# Patient Record
(demographics unavailable — no encounter records)

---

## 2018-01-13 NOTE — EDM.PDOC
ED HPI GENERAL MEDICAL PROBLEM





- General


Chief Complaint: Abdominal Pain


Stated Complaint: ABDOMINAL PAIN RUQ


Time Seen by Provider: 01/13/18 12:57


Source of Information: Reports: Patient


History Limitations: Reports: No Limitations





- History of Present Illness


INITIAL COMMENTS - FREE TEXT/NARRATIVE: 





This patient is a 63 year old female that presents to the ER. The patient 

reports that she has had RUQ abdominal pain for 3 days. She reports that she 

has had this abdominal pain for about 3 years that comes and goes. She reports 

she has had workups and has not been given a specific diagnosis for this. 

Patient reports that she just wanted to have it checked out today to make sure 

its not emergent because she flies tomorrow. The patient reports she has been 

eating peanuts lately. Patient denies ha, dizziness, cp, soa, urinary/bowel 

changes, neck pain, back pain. Stable. 


Onset Date: 01/10/18


Duration: Day(s): (3)


Location: Reports: Abdomen


Quality: Reports: Other (cramping)


Severity: Mild


Improves with: Reports: None


Worsens with: Reports: None


Associated Symptoms: Denies: Confusion, Chest Pain, Cough, cough w sputum, 

Diaphoresis, Fever/Chills, Headaches, Loss of Appetite, Malaise, Nausea/Vomiting

, Rash, Seizure, Shortness of Breath, Syncope, Weakness


  ** Right Upper Abdomen


Pain Score (Numeric/FACES): 5





- Related Data


 Allergies











Allergy/AdvReac Type Severity Reaction Status Date / Time


 


No Known Allergies Allergy   Verified 01/13/18 12:13











Home Meds: 


 Home Meds





Levothyroxine [Synthroid] 88 mcg PO DAILY 01/13/18 [History]


atorvaSTATin [Lipitor] 20 mg PO DAILY 01/13/18 [History]











Past Medical History


Cardiovascular History: Reports: Other (See Below)


Gastrointestinal History: Reports: Chronic Constipation


OB/GYN History: Reports: Other (See Below)


Other OB/BYN History: HYSTERECTOMY


Musculoskeletal History: Reports: Arthritis


Endocrine/Metabolic History: Reports: Hypothyroidism, Other (See Below)


Other Endocrine/Metabolic History: THRYOIDECTOMY DUE TO TUMORS





- Past Surgical History


Cardiovascular Surgical History: Reports: Carotid Stents





Social & Family History





- Family History


Family Medical History: Noncontributory





- Tobacco Use


Smoking Status *Q: Former Smoker


Years of Tobacco use: 15


Used Tobacco, but Quit: Yes


Month Tobacco Last Used: 1994





- Alcohol Use


Days Per Week of Alcohol Use: 1


Number of Drinks Per Day: 2


Total Drinks Per Week: 2





- Recreational Drug Use


Recreational Drug Use: No





ED ROS GENERAL





- Review of Systems


Review Of Systems: See Below


Constitutional: Reports: No Symptoms


HEENT: Reports: No Symptoms


Respiratory: Reports: No Symptoms


Cardiovascular: Reports: No Symptoms


Endocrine: Reports: No Symptoms


GI/Abdominal: Reports: Abdominal Pain (comes and goes, cramping. Good right now 

per patient. ).  Denies: Nausea, Vomiting


: Reports: No Symptoms


Musculoskeletal: Reports: No Symptoms


Skin: Reports: No Symptoms


Neurological: Reports: No Symptoms


Psychiatric: Reports: No Symptoms


Hematologic/Lymphatic: Reports: No Symptoms


Immunologic: Reports: No Symptoms





ED EXAM, GI/ABD





- Physical Exam


Exam: See Below


Exam Limited By: No Limitations


General Appearance: Alert, WD/WN, No Apparent Distress


Eyes: Bilateral: Normal Appearance


Ears: Normal External Exam, Normal Canal, Hearing Grossly Normal, Normal TMs


Nose: Normal Inspection, Normal Mucosa, No Blood


Throat/Mouth: Normal Inspection, Normal Lips, Normal Teeth, Normal Gums, Normal 

Oropharynx, Normal Voice, No Airway Compromise


Head: Atraumatic, Normocephalic


Neck: Normal Inspection, Supple, Non-Tender, Full Range of Motion


Respiratory/Chest: No Respiratory Distress, Lungs Clear, Normal Breath Sounds, 

No Accessory Muscle Use, Chest Non-Tender


Cardiovascular: Normal Peripheral Pulses, Regular Rate, Rhythm, No Edema, No 

Gallop, No JVD, No Murmur, No Rub


GI/Abdominal Exam: Normal Bowel Sounds, Soft, No Organomegaly, No Distention, 

No Abnormal Bruit, No Mass, Pelvis Stable, Tender (mild RUQ. ).  No: Distended, 

Guarding, Rigid, Rebound, Abnormal Bowel Sounds, Mass, Hepatomegaly, 

Splenomegaly


 (Female) Exam: Deferred


Rectal (Female) Exam: Deferred


Back Exam: Normal Inspection, Full Range of Motion.  No: CVA Tenderness (L), 

CVA Tenderness (R)


Extremities: Normal Inspection, Normal Range of Motion, Non-Tender, No Pedal 

Edema, Normal Capillary Refill


Neurological: Alert, Oriented, Normal Cognition, Normal Gait, No Motor/Sensory 

Deficits


Psychiatric: Normal Affect, Normal Mood


Skin Exam: Warm, Dry, Intact, Normal Color, No Rash


Lymphatic: No Adenopathy





Course





- Vital Signs


Last Recorded V/S: 


 Last Vital Signs











Temp  97.8 F   01/13/18 12:07


 


Pulse  90   01/13/18 12:07


 


Resp  18   01/13/18 12:07


 


BP  152/96 H  01/13/18 12:07


 


Pulse Ox  99   01/13/18 12:07














- Orders/Labs/Meds


Labs: 


 Laboratory Tests











  01/13/18 01/13/18 01/13/18 Range/Units





  12:20 12:20 12:21 


 


WBC  5.1    (5.0-10.0)  10^3/uL


 


RBC  4.14    (4.00-5.50)  10^6/uL


 


Hgb  12.9    (12.0-16.0)  g/dL


 


Hct  37.5    (37.0-47.0)  %


 


MCV  90.6    (82.0-94.0)  fL


 


MCH  31.2    (27.0-32.0)  pg


 


MCHC  34.4    (33.0-38.0)  g/dL


 


RDW Coeff of Magda  12.3    (11.0-15.0)  %


 


Plt Count  231    (150-400)  10^3/uL


 


Neut % (Auto)  56.3    (35-85)  %


 


Lymph % (Auto)  33.0    (10-55)  %


 


Mono % (Auto)  8.5    (0-16)  %


 


Eos % (Auto)  1.8    (0-5)  %


 


Baso % (Auto)  0.4    (0-3)  %


 


Neut # (Auto)  2.85    (1.80-7.00)  10^3/uL


 


Lymph # (Auto)  1.67    (1.00-4.80)  10^3/uL


 


Mono # (Auto)  0.43    (0.00-0.80)  10^3/uL


 


Eos # (Auto)  0.09    (0.00-0.45)  10^3/uL


 


Baso # (Auto)  0.02    10^3/uL


 


Sodium   134 L   (136-145)  mEq/L


 


Potassium   4.0   (3.5-5.0)  mEq/L


 


Chloride   100   ()  mEq/L


 


Carbon Dioxide   27   (21-32)  mmol/L


 


BUN   9   (7-18)  mg/dL


 


Creatinine   1.0   (0.6-1.0)  mg/dL


 


Est Cr Clr Drug Dosing   43.45   mL/min


 


Estimated GFR (MDRD)   56 L   (>=60)  mL/min


 


Glucose   156 H   (75-99)  mg/dL


 


Calcium   8.6   (8.4-10.1)  mg/dL


 


Total Bilirubin   0.7   (0.0-1.0)  mg/dL


 


AST   23   (15-37)  U/L


 


ALT   26   (12-78)  U/L


 


Alkaline Phosphatase   130 H   ()  U/L


 


Troponin I   < 0.017   (0.00-0.06)  ng/mL


 


Total Protein   7.7   (6.4-8.2)  g/dL


 


Albumin   4.1   (3.4-5.0)  g/dL


 


Amylase   50   ()  U/L


 


Urine Color    Yellow  (YELLOW)  


 


Urine Appearance    Clear  (CLEAR)  


 


Urine pH    6.0  (4.5-8.0)  


 


Ur Specific Gravity    1.015  (1.003-1.020)  


 


Urine Protein    Negative  (NEGATIVE)  mg/dL


 


Urine Glucose (UA)    Negative  (NEGATIVE)  mg/dL


 


Urine Ketones    Trace H  (NEGATIVE)  mg/dL


 


Urine Occult Blood    Negative  (NEGATIVE)  


 


Urine Nitrite    Negative  (NEGATIVE)  


 


Urine Bilirubin    Negative  (NEGATIVE)  


 


Urine Urobilinogen    0.2  (0.2-1.0)  EU/dL


 


Ur Leukocyte Esterase    Negative  (NEGATIVE)  


 


Urine RBC    Not seen  (0-5)  /HPF


 


Urine WBC    Not seen  (0-5)  /HPF


 


Ur Squamous Epith Cells    Occasional H  (NOT SEEN)  /HPF


 


Urine Bacteria    Occasional H  (NOT SEEN)  /HPF














Departure





- Departure


Time of Disposition: 13:13


Disposition: Home, Self-Care 01


Condition: Good


Clinical Impression: 


Abdominal pain


Qualifiers:


 Abdominal location: right upper quadrant Qualified Code(s): R10.11 - Right 

upper quadrant pain








- Discharge Information


Instructions:  Abdominal Pain, Adult, Easy-to-Read


Referrals: 


PCP,None [Primary Care Provider] - 


Forms:  ED Department Discharge


Additional Instructions: 


Followup with your primary care provider


Return to the ER for worsening of condition or any emergent concerns such as 

increase in pain, fever, vomiting. 


Increase fluids


Avoid Peanuts and Nuts





- Assessment/Plan


Plan: 





PLEASE SEE RN NOTE FOR PFSH.

## 2019-09-12 NOTE — EDM.PDOC
ED HPI GENERAL MEDICAL PROBLEM





- General


Chief Complaint: Cardiovascular Problem


Stated Complaint: high blood pressure


Time Seen by Provider: 09/12/19 22:50


Source of Information: Reports: Patient


History Limitations: Reports: No Limitations





- History of Present Illness


INITIAL COMMENTS - FREE TEXT/NARRATIVE: 





States that she didn't feel well at home and took her BP and it was up.  She 

had taken Mucinex DM about 1-2 hours prior due to cold symptoms that she has 

been having.  She has history of elevated BP when in the clinic and when back 

at home it will go back to normal.  Had some right shoulder pain with it and 

when BP was up she didn't feel as well.  Has some URI symptoms currently with 

dry cough.  No fever.  Did feel anxious at home as she repeated her BP.


Onset: Gradual


Location: Reports: Upper Extremity, Right (right shoulder pain.)


Associated Symptoms: Reports: Cough


Treatments PTA: Reports: Aspirin





- Related Data


 Allergies











Allergy/AdvReac Type Severity Reaction Status Date / Time


 


acetaminophen [From Lortab] Allergy  Swollen Verified 09/12/19 22:29





   Tongue  


 


adhesive tape Allergy  Rash Verified 09/12/19 22:29


 


hydrocodone [From Lortab] Allergy  Swollen Verified 09/12/19 22:29





   Tongue  











Home Meds: 


 Home Meds





Levothyroxine [Synthroid] 88 mcg PO DAILY 01/13/18 [History]


atorvaSTATin [Lipitor] 20 mg PO DAILY 01/13/18 [History]


Aspirin [Adult Low Dose Aspirin EC] 81 mg PO DAILY 09/12/19 [History]











Past Medical History


Cardiovascular History: Reports: High Cholesterol


Gastrointestinal History: Reports: Chronic Constipation


OB/GYN History: Reports: Pregnancy


Other OB/GYN History: HYSTERECTOMY


Musculoskeletal History: Reports: Arthritis


Endocrine/Metabolic History: Reports: Hypothyroidism


Other Endocrine/Metabolic History: THRYOIDECTOMY DUE TO TUMORS





- Past Surgical History


Cardiovascular Surgical History: Reports: Carotid Stents


Female  Surgical History: Reports: Hysterectomy, Other (See Below)


Other Female  Surgeries/Procedures: tumor removal from R) breast; non 

cancerous


Endocrine Surgical History: Reports: Thyroidectomy


Musculoskeletal Surgical History: Reports: Other (See Below)


Other Musculoskeletal Surgeries/Procedures:: rods to back





Social & Family History





- Family History


Family Medical History: Noncontributory





- Tobacco Use


Smoking Status *Q: Former Smoker


Used Tobacco, but Quit: Yes


Month/Year Tobacco Last Used: 1994





- Caffeine Use


Caffeine Use: Reports: Coffee





ED ROS GENERAL





- Review of Systems


Review Of Systems: See Below


Constitutional: Denies: Fever, Chills


HEENT: Reports: No Symptoms


Respiratory: Reports: Cough.  Denies: Shortness of Breath, Sputum


Cardiovascular: Denies: Chest Pain, Edema


GI/Abdominal: Reports: No Symptoms


Musculoskeletal: Reports: No Symptoms


Skin: Reports: No Symptoms


Neurological: Reports: No Symptoms


Psychiatric: Reports: Anxiety





ED EXAM, GENERAL





- Physical Exam


Exam: See Below


Exam Limited By: No Limitations


General Appearance: Alert, WD/WN, No Apparent Distress


Ears: Normal External Exam, Normal Canal, Normal TMs


Nose: Normal Inspection


Throat/Mouth: Normal Inspection, Normal Oropharynx


Head: Atraumatic, Normocephalic


Neck: Normal Inspection, Supple, Non-Tender


Respiratory/Chest: No Respiratory Distress, Lungs Clear, Normal Breath Sounds


Cardiovascular: Normal Peripheral Pulses, Regular Rate, Rhythm, No Edema, No 

Murmur


GI/Abdominal: Normal Bowel Sounds, Soft, Non-Tender, No Organomegaly


Back Exam: Normal Inspection


Extremities: Normal Inspection, No Pedal Edema, Normal Capillary Refill


Neurological: Alert, Oriented


Psychiatric: Normal Affect, Anxious


Skin Exam: Warm, Dry, Intact





Course





- Vital Signs


Last Recorded V/S: 


 Last Vital Signs











Temp  97.2 F   09/12/19 22:20


 


Pulse  70   09/12/19 23:25


 


Resp  18   09/12/19 22:20


 


BP  168/79 H  09/12/19 23:25


 


Pulse Ox  100   09/12/19 22:20














- Orders/Labs/Meds


Orders: 


 Active Orders 24 hr











 Category Date Time Status


 


 EKG Documentation Completion [RC] STAT Care  09/12/19 22:39 Active











Labs: 


 Laboratory Tests











  09/12/19 09/12/19 Range/Units





  22:48 22:48 


 


WBC  5.8   (5.0-10.0)  10^3/uL


 


RBC  4.09   (4.00-5.50)  10^6/uL


 


Hgb  12.7   (12.0-16.0)  g/dL


 


Hct  36.7 L   (37.0-47.0)  %


 


MCV  89.7   (82.0-94.0)  fL


 


MCH  31.1   (27.0-32.0)  pg


 


MCHC  34.6   (33.0-38.0)  g/dL


 


RDW Coeff of Magda  12.0   (11.0-15.0)  %


 


Plt Count  243   (150-400)  10^3/uL


 


Neut % (Auto)  40.6   (35-85)  %


 


Lymph % (Auto)  44.8   (10-55)  %


 


Mono % (Auto)  7.7   (0-16)  %


 


Eos % (Auto)  6.4 H   (0-5)  %


 


Baso % (Auto)  0.5   (0-3)  %


 


Neut # (Auto)  2.36   (1.80-7.00)  10^3/uL


 


Lymph # (Auto)  2.60   (1.00-4.80)  10^3/uL


 


Mono # (Auto)  0.45   (0.00-0.80)  10^3/uL


 


Eos # (Auto)  0.37   (0.00-0.45)  10^3/uL


 


Baso # (Auto)  0.03   10^3/uL


 


Sodium   138  (136-145)  mEq/L


 


Potassium   3.9  (3.5-5.0)  mEq/L


 


Chloride   101  ()  mEq/L


 


Carbon Dioxide   26  (21-32)  mmol/L


 


BUN   10  (7-18)  mg/dL


 


Creatinine   0.9  (0.6-1.0)  mg/dL


 


Est Cr Clr Drug Dosing   47.03  mL/min


 


Estimated GFR (MDRD)   > 60  (>=60)  mL/min


 


Glucose   107 H D  (75-99)  mg/dL


 


Calcium   9.0  (8.4-10.1)  mg/dL


 


Total Bilirubin   0.5  (0.0-1.0)  mg/dL


 


AST   21  (15-37)  U/L


 


ALT   19  (12-78)  U/L


 


Alkaline Phosphatase   139 H  ()  U/L


 


Lactate Dehydrogenase   183  (100-190)  U/L


 


Creatine Kinase   64  ()  U/L


 


Troponin I   < 0.017  (0.00-0.06)  ng/mL


 


Total Protein   7.4  (6.4-8.2)  g/dL


 


Albumin   4.0  (3.4-5.0)  g/dL














- Re-Assessments/Exams


Free Text/Narrative Re-Assessment/Exam: 





09/12/19 23:28In to review normal labs and EKG with pt and .








Departure





- Departure


Time of Disposition: 23:29


Disposition: Home, Self-Care 01


Clinical Impression: 


URI (upper respiratory infection)


Qualifiers:


 URI type: unspecified viral URI Qualified Code(s): J06.9 - Acute upper 

respiratory infection, unspecified





Hypertension


Qualifiers:


 Hypertension type: essential hypertension Qualified Code(s): I10 - Essential (

primary) hypertension





Instructions:  Viral Respiratory Infection, Easy-To-Read


Referrals: 


Marlene Elaine NP [Primary Care Provider] - 


Forms:  ED Department Discharge


Additional Instructions: 


Do not take any more Mucinex DM or any med with decongestants


monitor Bp periodically and keep track of it and take to appt with Gretchen Bates.


If pain occurs in chest or if don't feel well then return


Make appt with Provider to evaluate BP.





- Problem List & Annotations


(1) Hypertension


SNOMED Code(s): 44657008


   Code(s): I10 - ESSENTIAL (PRIMARY) HYPERTENSION   Status: Acute   Priority: 

High   Current Visit: Yes   


Qualifiers: 


   Hypertension type: essential hypertension   Qualified Code(s): I10 - 

Essential (primary) hypertension   





(2) URI (upper respiratory infection)


SNOMED Code(s): 91069174


   Code(s): J06.9 - ACUTE UPPER RESPIRATORY INFECTION, UNSPECIFIED   Status: 

Acute   Priority: Medium   Current Visit: Yes   


Qualifiers: 


   URI type: unspecified viral URI   Qualified Code(s): J06.9 - Acute upper 

respiratory infection, unspecified   





- Problem List Review


Problem List Initiated/Reviewed/Updated: Yes





- My Orders


Last 24 Hours: 


My Active Orders





09/12/19 22:39


EKG Documentation Completion [RC] STAT 














- Assessment/Plan


Last 24 Hours: 


My Active Orders





09/12/19 22:39


EKG Documentation Completion [RC] STAT

## 2020-03-26 NOTE — EDM.PDOC
ED HPI GENERAL MEDICAL PROBLEM





- General


Chief Complaint: General


Stated Complaint: headache, fatigue, chills, and nauseated


Time Seen by Provider: 03/26/20 14:35


Source of Information: Reports: Patient


History Limitations: Reports: No Limitations





- History of Present Illness


INITIAL COMMENTS - FREE TEXT/NARRATIVE: 





Presents to the ER today with diarrhea, nausea, vomiting and chills.  Denies 

any cough, nasal congestion or sneezing.  States that she has been checking her 

fever and she has not had any.  states that it started yesterday.  Most 

significant symptom is nausea at this time.


Onset: Gradual


Location: Reports: Abdomen


Associated Symptoms: Denies: Cough, cough w sputum, Shortness of Breath


  ** HEADACHE


Pain Score (Numeric/FACES): 3





- Related Data


 Allergies











Allergy/AdvReac Type Severity Reaction Status Date / Time


 


adhesive tape Allergy  Rash Verified 03/26/20 14:33


 


hydrocodone Allergy  Difficulty Verified 03/26/20 15:24





   Breathing  











Home Meds: 


 Home Meds





Levothyroxine [Synthroid] 88 mcg PO DAILY 01/13/18 [History]


atorvaSTATin [Lipitor] 20 mg PO DAILY 01/13/18 [History]


Aspirin [Adult Low Dose Aspirin EC] 81 mg PO DAILY 09/12/19 [History]











Past Medical History


Cardiovascular History: Reports: Other (See Below)


Gastrointestinal History: Reports: Chronic Constipation


OB/GYN History: Reports: Other (See Below)


Other OB/GYN History: HYSTERECTOMY


Musculoskeletal History: Reports: Arthritis


Endocrine/Metabolic History: Reports: Hypothyroidism


Other Endocrine/Metabolic History: THRYOIDECTOMY DUE TO TUMORS





- Past Surgical History


Cardiovascular Surgical History: Reports: Carotid Endarterectomy


Female  Surgical History: Reports: Hysterectomy


Endocrine Surgical History: Reports: Thyroidectomy


Neurological Surgical History: Reports: Other (See Below)


Other Neurological Surgeries/Procedures: back surgery





Social & Family History





- Family History


Family Medical History: Noncontributory





- Tobacco Use


Smoking Status *Q: Never Smoker


Second Hand Smoke Exposure: No





- Living Situation & Occupation


Living situation: Reports: , with Spouse





ED ROS GENERAL





- Review of Systems


Review Of Systems: See Below


Constitutional: Reports: Chills, Weakness.  Denies: Fever, Diaphoresis


HEENT: Reports: No Symptoms


Respiratory: Denies: Shortness of Breath, Cough, Sputum


Cardiovascular: Denies: Chest Pain, Edema


GI/Abdominal: Reports: Abdominal Pain, Diarrhea, Nausea, Vomiting.  Denies: 

Bloody Stool


: Reports: No Symptoms


Musculoskeletal: Reports: No Symptoms


Skin: Reports: No Symptoms.  Denies: Rash


Neurological: Reports: No Symptoms





ED EXAM, GENERAL





- Physical Exam


Exam: See Below


Exam Limited By: No Limitations


General Appearance: Alert, WD/WN, Mild Distress


Ears: Normal External Exam, Normal Canal, Normal TMs


Throat/Mouth: Normal Inspection, Normal Oropharynx


Head: Atraumatic, Normocephalic


Neck: Normal Inspection, Supple, Non-Tender


Respiratory/Chest: No Respiratory Distress, Lungs Clear, Normal Breath Sounds


Cardiovascular: Regular Rate, Rhythm, No Edema


GI/Abdominal: Normal Bowel Sounds, Soft, Non-Tender


Neurological: Alert, Oriented


Skin Exam: Warm, Dry, Intact





Course





- Vital Signs


Last Recorded V/S: 





 Last Vital Signs











Temp  97.3 F   03/26/20 14:07


 


Pulse  98   03/26/20 14:07


 


Resp  20   03/26/20 14:07


 


BP  156/94 H  03/26/20 14:07


 


Pulse Ox  96   03/26/20 14:07














- Orders/Labs/Meds


Orders: 





 Active Orders 24 hr











 Category Date Time Status


 


 C-REACTIVE PROTEIN [CHEM] Stat Lab  03/26/20 14:23 Ordered


 


 CBC WITH AUTO DIFF [HEME] Stat Lab  03/26/20 14:23 Ordered


 


 COMPREHENSIVE METABOLIC PN,CMP [CHEM] Stat Lab  03/26/20 14:23 Ordered


 


 INFLUENZA A+B AG SCREEN [RM] Stat Lab  03/26/20 14:40 Ordered


 


 UA W/ONEAL RFLX IF INDICATED [URIN] Stat Lab  03/26/20 14:23 Ordered


 


 Isolation [COMM] Routine Oth  03/26/20 14:40 Ordered














- Re-Assessments/Exams


Free Text/Narrative Re-Assessment/Exam: 





03/26/20 15:25  Discussed results of lab tests.  She does have a UTI and 

influenza A.  WBC is low and discussed that this is viral in nature.








Departure





- Departure


Time of Disposition: 15:27


Disposition: Home, Self-Care 01


Condition: Good


Clinical Impression: 


 Gastroenteritis





UTI (urinary tract infection)


Qualifiers:


 Urinary tract infection type: acute cystitis Hematuria presence: without 

hematuria Qualified Code(s): N30.00 - Acute cystitis without hematuria








- Discharge Information


*PRESCRIPTION DRUG MONITORING PROGRAM REVIEWED*: Not Applicable


*COPY OF PRESCRIPTION DRUG MONITORING REPORT IN PATIENT DIRK: Not Applicable


Instructions:  Viral Gastroenteritis, Adult, Urinary Tract Infection, Adult


Additional Instructions: 


Push fluids as much as tolerated.


Zofran ODT 4mg every 4-6 hours as needed for the nausea


Bactrim DS take twice a day for 3 days 


If not better in 24 hours call the clinic tomorrow.








Sepsis Event Note





- Evaluation


Sepsis Screening Result: No Definite Risk





- Focused Exam


Vital Signs: 





 Vital Signs











  Temp Pulse Resp BP Pulse Ox


 


 03/26/20 14:07  97.3 F  98  20  156/94 H  96











Date Exam was Performed: 03/26/20


Time Exam was Performed: 14:40





- Problem List & Annotations


(1) UTI (urinary tract infection)


SNOMED Code(s): 36021906


   Code(s): N39.0 - URINARY TRACT INFECTION, SITE NOT SPECIFIED   Status: Acute

   Priority: High   Current Visit: Yes   


Qualifiers: 


   Urinary tract infection type: acute cystitis   Hematuria presence: without 

hematuria   Qualified Code(s): N30.00 - Acute cystitis without hematuria   





(2) Gastroenteritis


SNOMED Code(s): 53003094


   Code(s): K52.9 - NONINFECTIVE GASTROENTERITIS AND COLITIS, UNSPECIFIED   

Status: Acute   Priority: High   Current Visit: Yes   





- Problem List Review


Problem List Initiated/Reviewed/Updated: Yes





- My Orders


Last 24 Hours: 





My Active Orders





03/26/20 14:23


C-REACTIVE PROTEIN [CHEM] Stat 


CBC WITH AUTO DIFF [HEME] Stat 


COMPREHENSIVE METABOLIC PN,CMP [CHEM] Stat 


UA W/ONEAL RFLX IF INDICATED [URIN] Stat 





03/26/20 14:40


INFLUENZA A+B AG SCREEN [RM] Stat 


Isolation [COMM] Routine 














- Assessment/Plan


Last 24 Hours: 





My Active Orders





03/26/20 14:23


C-REACTIVE PROTEIN [CHEM] Stat 


CBC WITH AUTO DIFF [HEME] Stat 


COMPREHENSIVE METABOLIC PN,CMP [CHEM] Stat 


UA W/ONEAL RFLX IF INDICATED [URIN] Stat 





03/26/20 14:40


INFLUENZA A+B AG SCREEN [RM] Stat 


Isolation [COMM] Routine

## 2023-02-06 NOTE — EDM.PDOC
ED HPI GENERAL MEDICAL PROBLEM





- General


Chief Complaint: Neuro Symptoms/Deficits


Stated Complaint: stroke


Time Seen by Provider: 03/28/20 21:58


Source of Information: Reports: EMS, Family


History Limitations: Reports: Altered Mental Status





- History of Present Illness


INITIAL COMMENTS - FREE TEXT/NARRATIVE: 





This patient is a 66 year old female that presents to the ER via EMS. Dispatch 

reported to EMS possible stroke. Stroke code called. Upon arrival to the ER, 

patient is unresponsive and history from EMS is patient was diagnosed with 

influenza A Thursday. At that time, RN and myself put on PPE equipment. Patient 

 in the ER room with patient is the historian. He reports that the 

patient started about Tuesday with complaints of generalized weakness, 

generally not feeling well, chills, headache, nausea, vomiting, and abdominal 

pain. The  reports the patient symptoms generally not feeling well, 

generally weak, chills, dry cough. He reports that the patient today at 9:30pm 

was sitting on the couch when she let out a loud scream, threw both arms out in 

front of her, gargled spit, shook all over for a short time, became nonverbal, 

not responding. Patient  reports that she has had a dry cough today. 


Onset: Today


Onset Date: 03/29/20


Onset Time: 21:30


Associated Symptoms: Reports: Confusion, Seizure





- Related Data


 Allergies











Allergy/AdvReac Type Severity Reaction Status Date / Time


 


adhesive tape Allergy  Rash Verified 03/28/20 22:05


 


hydrocodone Allergy  Difficulty Verified 03/28/20 22:05





   Breathing  











Home Meds: 


 Home Meds





Levothyroxine [Synthroid] 88 mcg PO DAILY 01/13/18 [History]


Aspirin [Adult Low Dose Aspirin EC] 81 mg PO DAILY 09/12/19 [History]


Sulfamethoxazole/Trimethoprim [Bactrim Ds Tablet] 1 each PO BID 03/28/20 [

History]











Past Medical History


Cardiovascular History: Reports: Other (See Below)


Gastrointestinal History: Reports: Chronic Constipation


OB/GYN History: Reports: Other (See Below)


Other OB/GYN History: HYSTERECTOMY


Musculoskeletal History: Reports: Arthritis


Endocrine/Metabolic History: Reports: Hypothyroidism


Other Endocrine/Metabolic History: THRYOIDECTOMY DUE TO TUMORS





- Past Surgical History


Cardiovascular Surgical History: Reports: Carotid Endarterectomy


Female  Surgical History: Reports: Hysterectomy


Endocrine Surgical History: Reports: Thyroidectomy


Neurological Surgical History: Reports: Other (See Below)


Other Neurological Surgeries/Procedures: back surgery





Social & Family History





- Family History


Family Medical History: Noncontributory





- Caffeine Use


Caffeine Use: Reports: Coffee





- Living Situation & Occupation


Living situation: Reports: , with Spouse





ED ROS GENERAL





- Review of Systems


Review Of Systems: See Below


Constitutional: Reports: Malaise (per ), Other (Unresponsive)


Respiratory: Reports: Cough (per )


GI/Abdominal: Reports: Nausea (per )


Neurological: Reports: Seizure, Other (Altered Mental Status.)





ED EXAM, NEURO





- Physical Exam


Exam: See Below


Exam Limited By: Other (Does not follow commands. Restless.)


General Appearance: Thin, Other (drowsy, but arrousable. Eye opens)


Eye Exam: Bilateral Eye: PERRL, Other (Does not follow commands for EOMI)


Ears: Normal External Exam, Normal Canal, Hearing Grossly Normal, Normal TMs


Nose: Normal Inspection, Normal Mucosa, No Blood


Throat/Mouth: Normal Inspection, Normal Lips, Normal Teeth, Normal Gums, Normal 

Oropharynx, No Airway Compromise.  No: Normal Voice (nonverbal)


Head Exam: Atraumatic, Normocephalic


Neck: Normal Inspection, Supple, Non-Tender


Respiratory/Chest: No Respiratory Distress, Lungs Clear, Normal Breath Sounds, 

No Accessory Muscle Use, Other (Airway intact. She is protecting her airway at 

this time. )


Cardiovascular: Normal Peripheral Pulses, Regular Rate, Rhythm, No Edema, No 

Gallop, No JVD, No Murmur, No Rub


GI/Abdominal: Soft, Non-Tender


Neurological: Withdraws to Pain, Other (See NIH Stroke Scale: Score is 8. 

Patient does not follow commands. Nonverbal. No unilateral weaknesses on exam. 

GCS 10 opens eyes spontaneous 4, no rebal response 1, moves to local pain 5. ).

  No: Oriented x 3, Abnormal Sensation, Tremor


Back Exam: Normal Inspection


Extremities: Normal Inspection, Normal Range of Motion, Non-Tender, No Pedal 

Edema, Normal Capillary Refill


Skin Exam: Warm, Dry, Intact, Normal Color, No Rash





Course





- Orders/Labs/Meds


Orders: 


 Active Orders 24 hr











 Category Date Time Status


 


 CULTURE BLOOD [BC] Stat Lab  03/28/20 22:20 Received











Labs: 


 Laboratory Tests











  03/28/20 03/28/20 03/28/20 Range/Units





  22:03 22:03 22:03 


 


WBC  7.0    (5.0-10.0)  10^3/uL


 


RBC  4.14    (4.00-5.50)  10^6/uL


 


Hgb  12.7    (12.0-16.0)  g/dL


 


Hct  33.5 L    (37.0-47.0)  %


 


MCV  80.9 L    (82.0-94.0)  fL


 


MCH  30.7    (27.0-32.0)  pg


 


MCHC  37.9    (33.0-38.0)  g/dL


 


RDW Coeff of Magda  11.5    (11.0-15.0)  %


 


Plt Count  285    (150-400)  10^3/uL


 


Neut % (Auto)  58.4    (35-85)  %


 


Lymph % (Auto)  27.4    (10-55)  %


 


Mono % (Auto)  12.9    (0-16)  %


 


Eos % (Auto)  1.0    (0-5)  %


 


Baso % (Auto)  0.3    (0-3)  %


 


Neut # (Auto)  4.06    (1.80-7.00)  10^3/uL


 


Lymph # (Auto)  1.91    (1.00-4.80)  10^3/uL


 


Mono # (Auto)  0.90 H    (0.00-0.80)  10^3/uL


 


Eos # (Auto)  0.07    (0.00-0.45)  10^3/uL


 


Baso # (Auto)  0.02    10^3/uL


 


PT    10.2  (9.7-12.3)  SEC


 


INR    0.99  (0.92-1.18)  


 


APTT    26.8  (23.2-32.3)  SEC


 


Sodium   110 L*   (136-145)  mEq/L


 


Potassium   4.0   (3.5-5.0)  mEq/L


 


Chloride   75 L   ()  mEq/L


 


Carbon Dioxide   21   (21-32)  mmol/L


 


BUN   10   (7-18)  mg/dL


 


Creatinine   1.2 H D   (0.6-1.0)  mg/dL


 


Est Cr Clr Drug Dosing   TNP   


 


Estimated GFR (MDRD)   45 L   (>=60)  mL/min


 


Glucose   119 H   (75-99)  mg/dL


 


Lactic Acid     (0.4-2.0)  mmol/L


 


Calcium   8.4   (8.4-10.1)  mg/dL


 


Creatine Kinase   280 H   ()  U/L


 


Troponin I   < 0.017   (0.00-0.06)  ng/mL


 


C-Reactive Protein   < 0.2 L   (0.2-0.8)  mg/dL


 


Urine Color     (YELLOW)  


 


Urine Appearance     (CLEAR)  


 


Urine pH     (4.5-8.0)  


 


Ur Specific Gravity     (1.003-1.020)  


 


Urine Protein     (NEGATIVE)  mg/dL


 


Urine Glucose (UA)     (NEGATIVE)  mg/dL


 


Urine Ketones     (NEGATIVE)  mg/dL


 


Urine Occult Blood     (NEGATIVE)  


 


Urine Nitrite     (NEGATIVE)  


 


Urine Bilirubin     (NEGATIVE)  


 


Urine Urobilinogen     (0.2-1.0)  EU/dL


 


Ur Leukocyte Esterase     (NEGATIVE)  


 


Urine RBC     (0-5)  /HPF


 


Urine WBC     (0-5)  /HPF


 


Ur Epithelial Cells     (NOT SEEN)  /HPF


 


Amorphous Sediment     (NOT SEEN)  /HPF


 


Urine Mucus     (NOT SEEN)  /HPF














  03/28/20 03/29/20 Range/Units





  22:03 00:20 


 


WBC    (5.0-10.0)  10^3/uL


 


RBC    (4.00-5.50)  10^6/uL


 


Hgb    (12.0-16.0)  g/dL


 


Hct    (37.0-47.0)  %


 


MCV    (82.0-94.0)  fL


 


MCH    (27.0-32.0)  pg


 


MCHC    (33.0-38.0)  g/dL


 


RDW Coeff of Magda    (11.0-15.0)  %


 


Plt Count    (150-400)  10^3/uL


 


Neut % (Auto)    (35-85)  %


 


Lymph % (Auto)    (10-55)  %


 


Mono % (Auto)    (0-16)  %


 


Eos % (Auto)    (0-5)  %


 


Baso % (Auto)    (0-3)  %


 


Neut # (Auto)    (1.80-7.00)  10^3/uL


 


Lymph # (Auto)    (1.00-4.80)  10^3/uL


 


Mono # (Auto)    (0.00-0.80)  10^3/uL


 


Eos # (Auto)    (0.00-0.45)  10^3/uL


 


Baso # (Auto)    10^3/uL


 


PT    (9.7-12.3)  SEC


 


INR    (0.92-1.18)  


 


APTT    (23.2-32.3)  SEC


 


Sodium    (136-145)  mEq/L


 


Potassium    (3.5-5.0)  mEq/L


 


Chloride    ()  mEq/L


 


Carbon Dioxide    (21-32)  mmol/L


 


BUN    (7-18)  mg/dL


 


Creatinine    (0.6-1.0)  mg/dL


 


Est Cr Clr Drug Dosing    


 


Estimated GFR (MDRD)    (>=60)  mL/min


 


Glucose    (75-99)  mg/dL


 


Lactic Acid  3.3 H   (0.4-2.0)  mmol/L


 


Calcium    (8.4-10.1)  mg/dL


 


Creatine Kinase    ()  U/L


 


Troponin I    (0.00-0.06)  ng/mL


 


C-Reactive Protein    (0.2-0.8)  mg/dL


 


Urine Color   Light yellow  (YELLOW)  


 


Urine Appearance   Clear  (CLEAR)  


 


Urine pH   8.0  (4.5-8.0)  


 


Ur Specific Gravity   1.020  (1.003-1.020)  


 


Urine Protein   Trace H  (NEGATIVE)  mg/dL


 


Urine Glucose (UA)   Negative  (NEGATIVE)  mg/dL


 


Urine Ketones   Negative  (NEGATIVE)  mg/dL


 


Urine Occult Blood   Negative  (NEGATIVE)  


 


Urine Nitrite   Negative  (NEGATIVE)  


 


Urine Bilirubin   Negative  (NEGATIVE)  


 


Urine Urobilinogen   0.2  (0.2-1.0)  EU/dL


 


Ur Leukocyte Esterase   Negative  (NEGATIVE)  


 


Urine RBC   Not seen  (0-5)  /HPF


 


Urine WBC   Not seen  (0-5)  /HPF


 


Ur Epithelial Cells   Occasional H  (NOT SEEN)  /HPF


 


Amorphous Sediment   Few H  (NOT SEEN)  /HPF


 


Urine Mucus   Few H  (NOT SEEN)  /HPF











Meds: 


Medications














Discontinued Medications














Generic Name Dose Route Start Last Admin





  Trade Name Kavon  PRN Reason Stop Dose Admin


 


Sodium Chloride  Confirm  03/28/20 22:02  03/28/20 23:49





  Normal Saline  Administered  03/28/20 22:03  125 mls/hr





  Dose   Administration





  1,000 mls @ as directed   





  .ROUTE   





  .STK-MED ONE   





     





     





     





     


 


Sodium Bicarbonate  Confirm  03/28/20 22:39  03/28/20 23:49





  Sodium Bicarbonate 8.4%  Administered  03/28/20 22:40  50 meq





  Dose   Administration





  50 meq   





  .ROUTE   





  .STK-MED ONE   





     





     





     





     














- Radiology Interpretation


Free Text/Narrative:: 





Head CT: Poor quality scan: From what is seen no bleed, no shift.


CXR: no acute findings. 


CT Results Date: 03/29/20


CT Results Time: 22:59





- Re-Assessments/Exams


Free Text/Narrative Re-Assessment/Exam: 





03/28/20 22:50


[facility] [place] activated for charting for nurse. No physician available at this time. 

Patient airway is intact. No intubation at this time. 





03/28/20 22:51


I called and spoke to [facility] to transfer this patient. ER 

physician is unavailable at this time for transfer report. Flight was activated 

with [facility] [place] for stroke protocol.





03/28/20 22:53


[facility] not available for physician. [facility] ER physician not available at this 

time. Stroke Neurologist was paged, spoke with Dr. Funes. He reports patient 

presents more like a seizure than a stroke. I do agree, as no unilateral 

weaknesses on exam and based on  history. He request that we obtain head 

ct results prior to transfer at this time, as it will determine where and how 

this patient is transferred. He said that he would speak to the ER physician,

but let us know when the results of CT are back. [facility] reported she would 

check on the CT status with radiology. I went back into evaluate patient after 

this conversation. 





03/28/20 2257


[facility] [place] now available Dr. Delgado reports to give patient 3% NS which we do not 

have in house. Therefore, 1 amp Sodium Bicarb was given IV over 10 minutes. NS 

to go at 125ml/hr. 





03/28/20 23:


I obtained CT result from radiologist who reports this is a poor head ct and 

missing the cerebellum, and temporal. From what he can see, there is no bleed, 

no shift. I then called [facility] again, reported the results to them. There is 

no ER physician available for report to transfer patient. We then attempted to 

contact internal medicine hospitalist. Dr. Odell reports the patient should 

probably go to the ICU. I did perform a COVID test on this patient due to 

recent complaints of chills, possible subjective fever, generally not feeling 

well, fatigue, headache, dry cough. However, Dr. Odell spoke to infectious 

disease Dr. Sanders and he reports since the patient has a negative chest xray

, no elevation in wbc, in Tam, positive recent influenza A test that we 

can cancel the test. This test was cancelled. Dr. Odell then spoke to Dr. Blas 

hospitalist internal medicine and he agrees that the sodium level indicates an 

ICU bed. Then, we spoke to Dr. [place] ICU, who has accepted the patient.





03/28/20 2340


I spoke with  and answered all of his questions and concerns. Patient is 

now being transferred via ALS ground.  





Departure





- Departure


Time of Disposition: 23:40


Disposition: DC/Tfer to [facility] 02


Condition: Serious


Clinical Impression: 


 Hyponatremia syndrome, Dehydration, Seizure








- Discharge Information


*PRESCRIPTION DRUG MONITORING PROGRAM REVIEWED*: Not Applicable


*COPY OF PRESCRIPTION DRUG MONITORING REPORT IN PATIENT DIRK: Not Applicable


Forms:  ED Department Discharge





Sepsis Event Note





- Focused Exam


Date Exam was Performed: 03/29/20


Time Exam was Performed: 02:46





- My Orders


Last 24 Hours: 


My Active Orders





03/28/20 22:20


CULTURE BLOOD [BC] Stat 














- Assessment/Plan


Last 24 Hours: 


My Active Orders





03/28/20 22:20


CULTURE BLOOD [BC] Stat 











Plan: 





PLEASE SEE RN NOTE FOR PFSH.





Patient is being transferred to [facility] ICU via ALS ground. The risk vs 

benefits explained to the . The risk of transfer is mvc, death, 

worsening of condition, respiratory arrest, seizure. The risk of staying in 

[place] is no icu, no neurologist, death, worsening of condition. The 

benefits of transfer are higher level of care, neurologist, icu. The benefits 

of staying in [place] is close to home. Patient notified of results and scheduled to see Dr Parsons